# Patient Record
Sex: FEMALE | Race: WHITE | Employment: UNEMPLOYED | URBAN - METROPOLITAN AREA
[De-identification: names, ages, dates, MRNs, and addresses within clinical notes are randomized per-mention and may not be internally consistent; named-entity substitution may affect disease eponyms.]

---

## 2020-11-09 ENCOUNTER — HOSPITAL ENCOUNTER (OUTPATIENT)
Dept: PREADMISSION TESTING | Age: 60
Setting detail: SPECIMEN
Discharge: HOME | End: 2020-11-13
Payer: MEDICARE

## 2020-11-09 PROCEDURE — U0003 INFECTIOUS AGENT DETECTION BY NUCLEIC ACID (DNA OR RNA); SEVERE ACUTE RESPIRATORY SYNDROME CORONAVIRUS 2 (SARS-COV-2) (CORONAVIRUS DISEASE [COVID-19]), AMPLIFIED PROBE TECHNIQUE, MAKING USE OF HIGH THROUGHPUT TECHNOLOGIES AS DESCRIBED BY CMS-2020-01-R: HCPCS

## 2020-11-10 LAB — SARS-COV-2, NAA: NOT DETECTED

## 2020-11-13 ENCOUNTER — ANESTHESIA EVENT (OUTPATIENT)
Dept: OPERATING ROOM | Age: 60
End: 2020-11-13
Payer: MEDICARE

## 2020-11-13 ENCOUNTER — HOSPITAL ENCOUNTER (OUTPATIENT)
Age: 60
Setting detail: OUTPATIENT SURGERY
Discharge: HOME | End: 2020-11-13
Attending: INTERNAL MEDICINE | Admitting: INTERNAL MEDICINE
Payer: MEDICARE

## 2020-11-13 ENCOUNTER — ANESTHESIA (OUTPATIENT)
Dept: OPERATING ROOM | Age: 60
End: 2020-11-13
Payer: MEDICARE

## 2020-11-13 VITALS — DIASTOLIC BLOOD PRESSURE: 102 MMHG | SYSTOLIC BLOOD PRESSURE: 235 MMHG | OXYGEN SATURATION: 97 %

## 2020-11-13 VITALS
SYSTOLIC BLOOD PRESSURE: 159 MMHG | OXYGEN SATURATION: 96 % | RESPIRATION RATE: 18 BRPM | TEMPERATURE: 97.5 F | HEART RATE: 56 BPM | DIASTOLIC BLOOD PRESSURE: 85 MMHG

## 2020-11-13 PROCEDURE — 2500000003 HC RX 250 WO HCPCS: Performed by: NURSE ANESTHETIST, CERTIFIED REGISTERED

## 2020-11-13 PROCEDURE — 6360000002 HC RX W HCPCS: Performed by: NURSE ANESTHETIST, CERTIFIED REGISTERED

## 2020-11-13 PROCEDURE — 88112 CYTOPATH CELL ENHANCE TECH: CPT

## 2020-11-13 PROCEDURE — 89051 BODY FLUID CELL COUNT: CPT

## 2020-11-13 PROCEDURE — 88305 TISSUE EXAM BY PATHOLOGIST: CPT

## 2020-11-13 PROCEDURE — 7100000000 HC PACU RECOVERY - FIRST 15 MIN: Performed by: INTERNAL MEDICINE

## 2020-11-13 PROCEDURE — 87206 SMEAR FLUORESCENT/ACID STAI: CPT

## 2020-11-13 PROCEDURE — 2709999900 HC NON-CHARGEABLE SUPPLY: Performed by: INTERNAL MEDICINE

## 2020-11-13 PROCEDURE — 2580000003 HC RX 258: Performed by: ANESTHESIOLOGY

## 2020-11-13 PROCEDURE — 7100000001 HC PACU RECOVERY - ADDTL 15 MIN: Performed by: INTERNAL MEDICINE

## 2020-11-13 PROCEDURE — 3700000001 HC ADD 15 MINUTES (ANESTHESIA): Performed by: INTERNAL MEDICINE

## 2020-11-13 PROCEDURE — 87070 CULTURE OTHR SPECIMN AEROBIC: CPT

## 2020-11-13 PROCEDURE — 3700000000 HC ANESTHESIA ATTENDED CARE: Performed by: INTERNAL MEDICINE

## 2020-11-13 PROCEDURE — 3609027000 HC BRONCHOSCOPY: Performed by: INTERNAL MEDICINE

## 2020-11-13 PROCEDURE — 7100000011 HC PHASE II RECOVERY - ADDTL 15 MIN: Performed by: INTERNAL MEDICINE

## 2020-11-13 PROCEDURE — 87205 SMEAR GRAM STAIN: CPT

## 2020-11-13 PROCEDURE — 87102 FUNGUS ISOLATION CULTURE: CPT

## 2020-11-13 PROCEDURE — 7100000010 HC PHASE II RECOVERY - FIRST 15 MIN: Performed by: INTERNAL MEDICINE

## 2020-11-13 RX ORDER — FENTANYL CITRATE 50 UG/ML
INJECTION, SOLUTION INTRAMUSCULAR; INTRAVENOUS PRN
Status: DISCONTINUED | OUTPATIENT
Start: 2020-11-13 | End: 2020-11-13 | Stop reason: SDUPTHER

## 2020-11-13 RX ORDER — ROCURONIUM BROMIDE 10 MG/ML
INJECTION, SOLUTION INTRAVENOUS PRN
Status: DISCONTINUED | OUTPATIENT
Start: 2020-11-13 | End: 2020-11-13 | Stop reason: SDUPTHER

## 2020-11-13 RX ORDER — LIDOCAINE HYDROCHLORIDE 20 MG/ML
INJECTION, SOLUTION EPIDURAL; INFILTRATION; INTRACAUDAL; PERINEURAL PRN
Status: DISCONTINUED | OUTPATIENT
Start: 2020-11-13 | End: 2020-11-13 | Stop reason: SDUPTHER

## 2020-11-13 RX ORDER — DEXAMETHASONE SODIUM PHOSPHATE 10 MG/ML
INJECTION, SOLUTION INTRAMUSCULAR; INTRAVENOUS PRN
Status: DISCONTINUED | OUTPATIENT
Start: 2020-11-13 | End: 2020-11-13 | Stop reason: SDUPTHER

## 2020-11-13 RX ORDER — LIDOCAINE HYDROCHLORIDE 10 MG/ML
1 INJECTION, SOLUTION EPIDURAL; INFILTRATION; INTRACAUDAL; PERINEURAL
Status: DISCONTINUED | OUTPATIENT
Start: 2020-11-13 | End: 2020-11-13 | Stop reason: HOSPADM

## 2020-11-13 RX ORDER — ONDANSETRON 2 MG/ML
INJECTION INTRAMUSCULAR; INTRAVENOUS PRN
Status: DISCONTINUED | OUTPATIENT
Start: 2020-11-13 | End: 2020-11-13 | Stop reason: SDUPTHER

## 2020-11-13 RX ORDER — SODIUM CHLORIDE 9 MG/ML
INJECTION, SOLUTION INTRAVENOUS CONTINUOUS
Status: DISCONTINUED | OUTPATIENT
Start: 2020-11-13 | End: 2020-11-13 | Stop reason: HOSPADM

## 2020-11-13 RX ORDER — SODIUM CHLORIDE, SODIUM LACTATE, POTASSIUM CHLORIDE, CALCIUM CHLORIDE 600; 310; 30; 20 MG/100ML; MG/100ML; MG/100ML; MG/100ML
INJECTION, SOLUTION INTRAVENOUS CONTINUOUS
Status: DISCONTINUED | OUTPATIENT
Start: 2020-11-13 | End: 2020-11-13 | Stop reason: HOSPADM

## 2020-11-13 RX ORDER — PROPOFOL 10 MG/ML
INJECTION, EMULSION INTRAVENOUS PRN
Status: DISCONTINUED | OUTPATIENT
Start: 2020-11-13 | End: 2020-11-13 | Stop reason: SDUPTHER

## 2020-11-13 RX ORDER — SODIUM CHLORIDE 0.9 % (FLUSH) 0.9 %
10 SYRINGE (ML) INJECTION EVERY 12 HOURS SCHEDULED
Status: DISCONTINUED | OUTPATIENT
Start: 2020-11-13 | End: 2020-11-13 | Stop reason: HOSPADM

## 2020-11-13 RX ORDER — LISINOPRIL 10 MG/1
10 TABLET ORAL DAILY
COMMUNITY

## 2020-11-13 RX ORDER — SUCCINYLCHOLINE/SOD CL,ISO/PF 100 MG/5ML
SYRINGE (ML) INTRAVENOUS PRN
Status: DISCONTINUED | OUTPATIENT
Start: 2020-11-13 | End: 2020-11-13 | Stop reason: SDUPTHER

## 2020-11-13 RX ORDER — SODIUM CHLORIDE 0.9 % (FLUSH) 0.9 %
10 SYRINGE (ML) INJECTION PRN
Status: DISCONTINUED | OUTPATIENT
Start: 2020-11-13 | End: 2020-11-13 | Stop reason: HOSPADM

## 2020-11-13 RX ADMIN — SODIUM CHLORIDE, POTASSIUM CHLORIDE, SODIUM LACTATE AND CALCIUM CHLORIDE: 600; 310; 30; 20 INJECTION, SOLUTION INTRAVENOUS at 13:05

## 2020-11-13 RX ADMIN — Medication 50 MCG: at 13:53

## 2020-11-13 RX ADMIN — PROPOFOL 150 MG: 10 INJECTION, EMULSION INTRAVENOUS at 13:28

## 2020-11-13 RX ADMIN — ONDANSETRON 4 MG: 2 INJECTION, SOLUTION INTRAMUSCULAR; INTRAVENOUS at 13:45

## 2020-11-13 RX ADMIN — PROPOFOL 100 MG: 10 INJECTION, EMULSION INTRAVENOUS at 13:34

## 2020-11-13 RX ADMIN — LIDOCAINE HYDROCHLORIDE 100 MG: 20 INJECTION, SOLUTION EPIDURAL; INFILTRATION; INTRACAUDAL; PERINEURAL at 13:28

## 2020-11-13 RX ADMIN — DEXAMETHASONE SODIUM PHOSPHATE 10 MG: 10 INJECTION INTRAMUSCULAR; INTRAVENOUS at 13:33

## 2020-11-13 RX ADMIN — PROPOFOL 100 MG: 10 INJECTION, EMULSION INTRAVENOUS at 13:40

## 2020-11-13 RX ADMIN — Medication 100 MG: at 13:28

## 2020-11-13 RX ADMIN — ROCURONIUM BROMIDE 10 MG: 10 INJECTION, SOLUTION INTRAVENOUS at 13:28

## 2020-11-13 RX ADMIN — Medication 50 MCG: at 13:28

## 2020-11-13 ASSESSMENT — PULMONARY FUNCTION TESTS
PIF_VALUE: 18
PIF_VALUE: 12
PIF_VALUE: 3
PIF_VALUE: 18
PIF_VALUE: 28
PIF_VALUE: 1
PIF_VALUE: 5
PIF_VALUE: 1
PIF_VALUE: 1
PIF_VALUE: 27
PIF_VALUE: 22
PIF_VALUE: 17
PIF_VALUE: 3
PIF_VALUE: 3
PIF_VALUE: 1
PIF_VALUE: 17
PIF_VALUE: 25
PIF_VALUE: 16
PIF_VALUE: 31
PIF_VALUE: 2
PIF_VALUE: 27
PIF_VALUE: 2
PIF_VALUE: 24

## 2020-11-13 ASSESSMENT — PAIN SCALES - GENERAL
PAINLEVEL_OUTOF10: 0

## 2020-11-13 NOTE — ANESTHESIA POSTPROCEDURE EVALUATION
Department of Anesthesiology  Postprocedure Note    Patient: Neha Gunn  MRN: [id number]  YOB: 1960  Date of evaluation: 11/13/2020  Time:  3:09 PM     Procedure Summary     Date:  11/13/20 Room / Location:  [address]    Anesthesia Start:  8840 Anesthesia Stop:  4420    Procedure:  BRONCHOSCOPY WITH BAL (N/A ) Diagnosis:  (DX PERSISTANT COUGH)    Surgeon:  Segundo Irby MD Responsible Provider:  Payton Torres DO    Anesthesia Type:  general ASA Status:  3          Anesthesia Type: No value filed. Kirby Phase I: Kirby Score: 10    Kirby Phase II: Kirby Score: 10    Last vitals: Reviewed and per EMR flowsheets.        Anesthesia Post Evaluation    Patient location during evaluation: PACU  Patient participation: complete - patient participated  Level of consciousness: awake and alert  Airway patency: patent  Nausea & Vomiting: no nausea and no vomiting  Complications: no  Cardiovascular status: hemodynamically stable  Respiratory status: acceptable  Hydration status: stable

## 2020-11-13 NOTE — ANESTHESIA PRE PROCEDURE
Department of Anesthesiology  Preprocedure Note       Name:  João Ayala   Age:  61 y.o.  :  1960                                          MRN:  [id number]         Date:  2020      Surgeon: Francisco Hussein):  Sly Boyle MD    Procedure: Procedure(s):  BRONCHOSCOPY WITH BAL    Medications prior to admission:   Prior to Admission medications    Medication Sig Start Date End Date Taking? Authorizing Provider   trospium (SANCTURA) 20 MG tablet Take 1 tablet by mouth 2 times daily 5/14/15  Yes Allean Cogan, MD   benzonatate (TESSALON PERLES) 100 MG capsule Take 2 capsules by mouth 3 times daily as needed for Cough. 13  Yes Keyla Payne MD   Multiple Vitamins-Minerals (MULTIVITAMIN PO) Take  by mouth. OTC   Yes Keyla Payne MD   albuterol-ipratropium [facility])  MCG/ACT inhaler Inhale 2 puffs into the lungs every 6 hours as needed.    Yes Historical Provider, MD   lisinopril (PRINIVIL;ZESTRIL) 10 MG tablet Take 10 mg by mouth daily    Historical Provider, MD       Current medications:    Current Facility-Administered Medications   Medication Dose Route Frequency Provider Last Rate Last Dose    0.9 % sodium chloride infusion   Intravenous Continuous Ndal Guss Najjar, MD        lactated ringers infusion   Intravenous Continuous Giovani Menard  mL/hr at 20 1305      sodium chloride flush 0.9 % injection 10 mL  10 mL Intravenous 2 times per day Giovani Menard MD        sodium chloride flush 0.9 % injection 10 mL  10 mL Intravenous PRN Giovani Menard MD        lidocaine PF 1 % injection 1 mL  1 mL Intradermal Once PRN Giovani Menard MD           Allergies:  No Known Allergies    Problem List:    Patient Active Problem List   Diagnosis Code    Family history of colon cancer Z80.0    HTN (hypertension) I10    GERD (gastroesophageal reflux disease) K21.9    History of anemia Z86.2    [facility] J44.9    Obesity E66.9    History of colon polyps Z86.010    JONNY (stress urinary incontinence, female) N39.3       Past Medical History:        Diagnosis Date    COPD (chronic obstructive pulmonary disease) ([facility] 2013    Family history of colon cancer 2013    GERD (gastroesophageal reflux disease) 2013    History of anemia 2013    History of colon polyps 2013    HTN (hypertension) 2013    Obesity 2013    PONV (postoperative nausea and vomiting)     Urinary incontinence        Past Surgical History:        Procedure Laterality Date    BLADDER SUSPENSION      COLONOSCOPY  12    CYSTOSCOPY  2015    with urodynamics    ORIF HUMERUS DECOMPRESSION Right 09    OSTEOTOMY Right 09    olecranon     OTHER SURGICAL HISTORY Right 10/28/09    RT ELBOW ORIF    TONSILLECTOMY  1967    TUBAL LIGATION         Social History:    Social History     Tobacco Use    Smoking status: Former Smoker     Years: 28.00     Last attempt to quit: 2000     Years since quittin.9    Smokeless tobacco: Never Used   Substance Use Topics    Alcohol use: Yes     Comment: social                                Counseling given: Not Answered      Vital Signs (Current):   Vitals:    20 1355 20 1415 20 1430 20 1445   BP: 139/83 (!) 150/77 (!) 170/89 (!) 159/85   Pulse: 101 82 58 56   Resp:  16 18   Temp: 97.9 °F (36.6 °C)   97.5 °F (36.4 °C)   TempSrc: Temporal      SpO2: 97% 96% 97% 96%                                              BP Readings from Last 3 Encounters:   20 (!) 159/85   20 (!) 235/102   05/14/15 124/77       NPO Status: Time of last liquid consumption: 1800                        Time of last solid consumption: 1800                        Date of last liquid consumption: 20                        Date of last solid food consumption: 20    BMI:   Wt Readings from Last 3 Encounters:   05/14/15 238 lb 12.1 oz (108.3 kg)   14 231 lb (104.8 kg)   01/16/14 230 lb (104.3 kg)     There is no height or weight on file to calculate BMI.    CBC:   Lab Results   Component Value Date    WBC 5.9 11/25/2013    RBC 4.71 11/25/2013    RBC 4.30 02/28/2012    HGB 13.1 11/25/2013    HCT 38.8 11/25/2013    MCV 82.4 11/25/2013    RDW 14.9 11/25/2013     11/25/2013     02/28/2012       CMP:   Lab Results   Component Value Date     11/25/2013    K 5.0 11/25/2013     11/25/2013    CO2 30 11/25/2013    BUN 17 11/25/2013    CREATININE 0.67 11/25/2013    GFRAA >60 11/25/2013    LABGLOM >60 11/25/2013    GLUCOSE 98 11/25/2013    GLUCOSE 92 02/28/2012    PROT 7.1 11/25/2013    CALCIUM 9.4 11/25/2013    BILITOT 0.41 11/25/2013    ALKPHOS 39 11/25/2013    AST 16 11/25/2013    ALT 14 11/25/2013       POC Tests: No results for input(s): POCGLU, POCNA, POCK, POCCL, POCBUN, POCHEMO, POCHCT in the last 72 hours. Coags: No results found for: PROTIME, INR, APTT    HCG (If Applicable): No results found for: PREGTESTUR, PREGSERUM, HCG, HCGQUANT     ABGs: No results found for: PHART, PO2ART, ZWM7RLH, LCE8SNZ, BEART, L1ADZYJI     Type & Screen (If Applicable):  No results found for: LABABO, LABRH    Drug/Infectious Status (If Applicable):  No results found for: HIV, HEPCAB    COVID-19 Screening (If Applicable):   Lab Results   Component Value Date    COVID19 Not Detected 11/09/2020         Anesthesia Evaluation  Patient summary reviewed and Nursing notes reviewed   history of anesthetic complications: PONV.   Airway: Mallampati: II  TM distance: >3 FB   Neck ROM: full  Mouth opening: > = 3 FB Dental: normal exam         Pulmonary:normal exam    (+) COPD:                             Cardiovascular:  Exercise tolerance: no interval change,   (+) hypertension:,     (-) past MI, CAD and CABG/stent        Rate: normal                    Neuro/Psych:               GI/Hepatic/Renal:   (+) GERD:,           Endo/Other:                     Abdominal: Vascular:                                        Anesthesia Plan      general     ASA 3       Induction: intravenous. Anesthetic plan and risks discussed with patient. Plan discussed with CRNA.     Attending anesthesiologist reviewed and agrees with Pre Eval content              Sinan Del Rosario DO   11/13/2020

## 2020-11-13 NOTE — OP NOTE
Operative Note    Procedure Note: Bronchoscopy  Ena Councilman, MD     Pre-op Diagnosis: Chronic cough  Post-op Diagnosis: Significant endobronchial inflammation  Bronchoscopist: Ena Councilman, MD  Anesthesia: General anesthesia  Procedure: Flexible fiberoptic bronchoscopy    Indications and History  The patient is a 61 y.o. female with chronic cough. (Please see today's progress notes for the latest issues,  physical exam and lab data)    Consent to Procedure  The risks, benefits, complications, treatment options and expected outcomes were discussed with the patient. The possibilities of reaction to medication, pulmonary aspiration, perforation of a viscus, bleeding, failure to diagnose a condition and creating a complication requiring transfusion or operation were discussed with the patient, who freely signed the consent. Description of Procedure  The patient was intubated on mechanical ventilation and placed on 100% oxygen. Aliya Florez was monitored by the anesthesia service. Aliya Dotsonkins and the procedure were verified as Flexible Fiberoptic Bronchoscopy. A Time Out was held and the above information confirmed. The bronchoscope was then passed into the trachea via the ET tube. Lidocaine 1% solution 2 ml at a time was applied topically to the jean claude. After careful inspection of the tracheal, the bronchoscope was sequentially passed into all segments of right and left endobronchial trees to the second and/or third divisions. Endobronchial findings  Vocal cords: Vocal cord was visualized with glidoscope, relatively normal-looking vocal cords. Distal trachea: Mild inflammatory changes were noted with mild tracheomalcia. CarinaFredricka Mons and mobile with no mass. Right endobronchial trees: The endobronchial survey was significant for   · No mass  · No bleeding  · Moderate to severe inflammation  · Mild bronchomalcia  · No significant mucous plugging  ·   Left endobronchial trees:  The endobronchial survey was significant for   · No mass  · No bleeding  · Moderate to severe inflammation  · Moderate bronchomalcia, especially in the left lower lobe  · No significant mucous plugging    Estimated Blood Loss: none  Complications  none    Specimens Taken:  BAL -  Location -right middle lobe    Electronically signed by     Andrew Ochoa MD, [facility]  Pulmonary Critical Care and Sleep Medicine,  [facility]  Cell: [phone]  Office: [phone]    11/13/2020 at 1:44 PM

## 2020-11-13 NOTE — H&P
History and Physical Update    Pt Name: Natalee Atwood  MRN: [id number]  [place]: 1960  Date of evaluation: 11/13/2020      [x] I have reviewed the hardcopy Pulmonology Progress Note by Cliff Calvo NP dated 11/4/20 labeled and in short chart which meets the criteria for an Interval History and Physical note. [x] I have examined  Natalee Atwood  There are no changes to the patient who is scheduled for a Bronchoscopy by Dr Maik Lozano for persistent cough. The patient denies new health changes, fever, chills, wheezing, cough, increased SOB, chest pain, open sores or wounds. PMH, Surgical History, Social History, Psych, and Family History reviewed and updated in EPIC in appropriate section. Vital signs: BP (!) 154/80   Pulse (!) 48   Temp 96.9 °F (36.1 °C) (Temporal)   Resp 18   SpO2 98%     Allergies:  Patient has no known allergies. Medications:    Prior to Admission medications    Medication Sig Start Date End Date Taking? Authorizing Provider   trospium (SANCTURA) 20 MG tablet Take 1 tablet by mouth 2 times daily 5/14/15  Yes Lillian Calvo MD   benzonatate (TESSALON PERLES) 100 MG capsule Take 2 capsules by mouth 3 times daily as needed for Cough. 12/4/13  Yes Faustina Islas MD   Multiple Vitamins-Minerals (MULTIVITAMIN PO) Take  by mouth. OTC   Yes Faustina Islas MD   albuterol-ipratropium [facility])  MCG/ACT inhaler Inhale 2 puffs into the lungs every 6 hours as needed. Yes Historical Provider, MD   lisinopril (PRINIVIL;ZESTRIL) 10 MG tablet Take 10 mg by mouth daily    Historical Provider, MD         This is a 61 y.o.morbidly obese female who is pleasant, cooperative, alert and oriented x3, in no acute distress. Heart: Heart sounds are normal.  HR 48 asymptomatic bradycardic rate and regular rhythm without murmur, gallop or rub.    Lungs: Frequent nonproductive cough diminished breath sounds, Normal respiratory effort with equal expansion, unlabored at rest, wheezes or rales bilaterally   Abdomen: round, obese, soft, nontender, nondistended with bowel sounds . Labs:  No results for input(s): HGB, HCT, WBC, MCV, PLT, NA, K, CL, CO2, BUN, CREATININE, GLUCOSE, INR, PROTIME, APTT, AST, ALT, LABALBU, HCG in the last 720 hours.     Recent Labs     11/09/20  1135   COVID19 Not Detected       James Cai ANP-BC  Electronically signed 11/13/2020 at 1:01 PM

## 2020-11-14 LAB
DIRECT EXAM: NORMAL
Lab: NORMAL
SPECIMEN DESCRIPTION: NORMAL

## 2020-11-15 LAB
CULTURE: ABNORMAL
DIRECT EXAM: ABNORMAL
Lab: ABNORMAL
SPECIMEN DESCRIPTION: ABNORMAL

## 2020-11-16 LAB
CASE NUMBER:: NORMAL
SPECIMEN DESCRIPTION: NORMAL

## 2020-11-17 LAB
APPEARANCE FLUID: NORMAL
BASO FLUID: NORMAL %
COLOR FLUID: NORMAL
EOSINOPHIL FLUID: NORMAL %
FLUID DIFF COMMENT: NORMAL
LYMPHOCYTES, BODY FLUID: 20 %
MONOCYTE, FLUID: NORMAL %
NEUTROPHIL, FLUID: 4 %
OTHER CELLS FLUID: NORMAL %
RBC FLUID: 2250 /MM3
SPECIMEN TYPE: NORMAL
SURGICAL PATHOLOGY REPORT: NORMAL
WBC FLUID: 13 /MM3

## 2020-12-14 LAB
CULTURE: NORMAL
Lab: NORMAL
SPECIMEN DESCRIPTION: NORMAL

## 2025-05-06 ENCOUNTER — OFFICE VISIT (OUTPATIENT)
Age: 65
End: 2025-05-06

## 2025-05-06 VITALS
DIASTOLIC BLOOD PRESSURE: 82 MMHG | TEMPERATURE: 97.8 F | WEIGHT: 225 LBS | OXYGEN SATURATION: 98 % | RESPIRATION RATE: 16 BRPM | HEIGHT: 62 IN | HEART RATE: 80 BPM | BODY MASS INDEX: 41.41 KG/M2 | SYSTOLIC BLOOD PRESSURE: 168 MMHG
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
## 2025-05-06 DIAGNOSIS — S80.212A ABRASION OF KNEE, BILATERAL: ICD-10-CM

## 2025-05-06 DIAGNOSIS — I10 PRIMARY HYPERTENSION: ICD-10-CM

## 2025-05-06 DIAGNOSIS — S80.211A ABRASION OF KNEE, BILATERAL: ICD-10-CM

## 2025-05-06 RX ORDER — FLUOXETINE HYDROCHLORIDE 40 MG/1
40 CAPSULE ORAL DAILY
COMMUNITY
Start: 2025-01-24

## 2025-05-06 RX ORDER — AZELASTINE 1 MG/ML
1 SPRAY, METERED NASAL 2 TIMES DAILY
COMMUNITY
Start: 2024-09-06

## 2025-05-06 RX ORDER — HYDROXYZINE HYDROCHLORIDE 25 MG/1
TABLET, FILM COATED ORAL
COMMUNITY
Start: 2025-04-22

## 2025-05-06 RX ORDER — LORATADINE 10 MG/1
10 TABLET ORAL EVERY MORNING
COMMUNITY
Start: 2025-04-21

## 2025-05-06 RX ORDER — AMLODIPINE BESYLATE 10 MG/1
TABLET ORAL
COMMUNITY
Start: 2025-03-30

## 2025-05-06 RX ORDER — PANTOPRAZOLE SODIUM 40 MG/1
40 TABLET, DELAYED RELEASE ORAL EVERY MORNING
COMMUNITY
Start: 2025-03-22

## 2025-05-06 RX ORDER — METHOCARBAMOL 750 MG/1
50000 TABLET ORAL WEEKLY
COMMUNITY
Start: 2025-04-23 | End: 2025-07-10

## 2025-05-06 RX ORDER — ATORVASTATIN CALCIUM 10 MG/1
10 TABLET, FILM COATED ORAL EVERY MORNING
COMMUNITY
Start: 2025-04-02

## 2025-05-06 RX ORDER — CYCLOBENZAPRINE HCL 10 MG
10 TABLET ORAL EVERY 8 HOURS PRN
COMMUNITY
Start: 2025-04-22

## 2025-05-06 RX ORDER — PROPRANOLOL HCL 20 MG
TABLET ORAL
COMMUNITY
Start: 2025-03-13

## 2025-05-06 RX ORDER — VALACYCLOVIR HYDROCHLORIDE 500 MG/1
500 TABLET, FILM COATED ORAL DAILY
COMMUNITY
Start: 2024-07-17

## 2025-05-06 RX ORDER — LINACLOTIDE 290 UG/1
1 CAPSULE, GELATIN COATED ORAL EVERY MORNING
COMMUNITY
Start: 2025-04-02

## 2025-05-06 RX ORDER — DICLOFENAC POTASSIUM 25 MG/1
CAPSULE, LIQUID FILLED ORAL
COMMUNITY

## 2025-05-06 ASSESSMENT — ENCOUNTER SYMPTOMS
RHINORRHEA: 0
ABDOMINAL PAIN: 0
ALLERGIC/IMMUNOLOGIC NEGATIVE: 1
SORE THROAT: 0
EYES NEGATIVE: 1
RESPIRATORY NEGATIVE: 1

## 2025-05-06 NOTE — PROGRESS NOTES
Urgent Care Triage to ED:       Silke Bagley (:  1960) is a 65 y.o. female,New patient, here for evaluation of the following :  Chief Complaint   Patient presents with    Fall     Fell walking into house   :     Objective      Vitals:    25 1752   BP: (!) 168/82   BP Site: Left Upper Arm   Patient Position: Sitting   BP Cuff Size: Medium Adult   Pulse: 80   Resp: 16   Temp: 97.8 °F (36.6 °C)   TempSrc: Oral   SpO2: 98%   Weight: 102.1 kg (225 lb)   Height: 1.575 m (5' 2\")         Assessment & Plan      The patient was evaluated for: Injury with possible hip/knee fractures as differential diagnosis.       Due to the complexity of patient's concerns, medical risks, and potential high risk for  complications, I recommend patient to be evaluated in the emergency room today.   The risks have been explained to the patient, including need to have HTN checked/treated.    Patient is alert with normal mental status and adequate capacity to make medical decisions.   Opportunities to ask questions about medical condition were provided.      Attempt to enlist the patient's family support to safely transfer the patient to emergency room: YES.    Patient was able to understand and state the risks and benefits of recommended treatment recommendation.       Urgent Care Disposition: Recommend to emergency room transport via private vehicle.  Report given to ED: Yes    Margoth Saavedra, APRN - CNP  Silke Bagley (: 1960) is a 65 y.o. female, New patient, here for evaluation of the following complaint(s): Fall (Fell walking into house)       Urgent Care Triage to ED:       Silke Bagley (:  1960) is a 65 y.o. female,New patient, here for evaluation of the following :  Chief Complaint   Patient presents with    Fall     Fell walking into house   :     Objective      Vitals:    25 1752   BP: (!) 168/82   BP Site: Left Upper Arm   Patient Position: Sitting   BP Cuff Size: Medium Adult   Pulse: 80

## (undated) DEVICE — CO2 CANNULA,SUPERSOFT, ADLT,7'O2,7'CO2: Brand: MEDLINE

## (undated) DEVICE — BLOCK BITE 60FR RUBBER ADLT DENTAL

## (undated) DEVICE — CONTAINER,SPECIMEN,OR STERILE,4OZ: Brand: MEDLINE

## (undated) DEVICE — SINGLE USE SUCTION VALVE MAJ-209: Brand: SINGLE USE SUCTION VALVE (STERILE)

## (undated) DEVICE — GLOVE SURG SZ 8 L12IN THK91MIL BRN LTX FREE POLYCHLOROPRENE

## (undated) DEVICE — SINGLE USE BIOPSY VALVE MAJ-210: Brand: SINGLE USE BIOPSY VALVE (STERILE)

## (undated) DEVICE — ADAPTER TBNG LUER STUB 15 GA INTMED

## (undated) DEVICE — TUBING, SUCTION, 1/4" X 12', STRAIGHT: Brand: MEDLINE

## (undated) DEVICE — CONMED DISPOSABLE BRONCHIAL CYTOLOGY BRUSH, STRAIGHT HANDLE, Ø2 MM: Brand: CONMED

## (undated) DEVICE — SOLUTION IV IRRIG POUR BRL 0.9% SODIUM CHL 2F7124

## (undated) DEVICE — MEDICINE CUP, GRADUATED, STER: Brand: MEDLINE